# Patient Record
Sex: FEMALE | Race: WHITE | NOT HISPANIC OR LATINO | Employment: OTHER | URBAN - METROPOLITAN AREA
[De-identification: names, ages, dates, MRNs, and addresses within clinical notes are randomized per-mention and may not be internally consistent; named-entity substitution may affect disease eponyms.]

---

## 2019-04-05 NOTE — PATIENT DISCUSSION
New Prescription: Restasis (cyclosporine): dropperette: 0.05% 1 drop twice a day as directed into both eyes 04-

## 2019-06-18 NOTE — PATIENT DISCUSSION
Continue: Refresh Repair (carboxymethylcellulose-glycern): drops: 0.5-0.9% 1 drop twice a day as needed into both eyes

## 2019-06-18 NOTE — PATIENT DISCUSSION
Continue: Restasis (cyclosporine): dropperette: 0.05% 1 drop twice a day as directed into both eyes 04-

## 2021-06-15 NOTE — PATIENT DISCUSSION
"Patient complains of ""floaters"" in their vision.  Should an increase of floaters or flashes of light begin

## 2021-06-15 NOTE — PATIENT DISCUSSION
VA stable, if worsens can refer to Retina for evaluation. OCT MAC performed today and reviewed with patient.

## 2021-06-15 NOTE — PATIENT DISCUSSION
Continue Restasis BID OU, Retaine MGD BID OU, Refresh Relieva  as needed and  Cromolyn use only for itching OU.

## 2021-06-15 NOTE — PATIENT DISCUSSION
Stopped Today: Refresh Repair (carboxymethylcellulose-glycern): drops: 0.5-0.9% 1 drop twice a day as needed into both eyes

## 2022-03-03 ENCOUNTER — NEW PATIENT (OUTPATIENT)
Dept: URBAN - METROPOLITAN AREA CLINIC 11 | Facility: CLINIC | Age: 67
End: 2022-03-03

## 2022-03-03 DIAGNOSIS — H34.8320: ICD-10-CM

## 2022-03-03 DIAGNOSIS — H35.361: ICD-10-CM

## 2022-03-03 PROCEDURE — 67028 INJECTION EYE DRUG: CPT

## 2022-03-03 PROCEDURE — 92134 CPTRZ OPH DX IMG PST SGM RTA: CPT

## 2022-03-03 NOTE — PATIENT DISCUSSION
45 minutes was spent in conversation and examination with patient. Avastin injection done today without complication in her left eye.  I will plan to see her again in one month for exam and possible Avastin series in the left eye.  The right eye is stable and I recommend observation there.

## 2022-03-03 NOTE — PROCEDURE NOTE: CLINICAL
PROCEDURE NOTE: Avastin () #1 OS. Diagnosis: Branch Retinal Vein Occlusion with Macular Edema. Anesthesia: Topical/Subconjunctival. Prep: Betadine Drops and Betadine Scrub. Betadine prep was performed. Product is within expiration date, and has been verified. An unopened 30 g needle was securely affixed to the 1 cc syringe. Excess drug and air bubbles were carefully expressed such that the plunger aligned with the .05 mL rylee on the syringe. A lid speculum was used. After the Betadine prep, intravitreal injection of Avastin 1.25mg/0.05 ml was given. Injection site: 3-4 mm from the limbus. The needle was withdrawn; retinal perfusion was verified. Lid speculum removed. The eye was irrigated with sterile irrigating solution. The betadine was washed away. Patient tolerated procedure well. Count fingers vision was verified. There were no complications. Patient given office phone number/answering service phone number. Post-injection instructions were reviewed and understood. Symptoms of RD and endophthalmitis following intravitreal injection were discussed. Patient advised to call right away if any loss of vision, new floaters, or eye pain. Post-op instructions given. Patient was given the standard instruction sheet. Appropriate follow-up was arranged. Guille Neal

## 2022-03-07 ASSESSMENT — TONOMETRY
OD_IOP_MMHG: 21
OS_IOP_MMHG: 19

## 2022-03-07 ASSESSMENT — VISUAL ACUITY
OD_CC: 20/20-1
OS_CC: 20/100

## 2022-04-05 ENCOUNTER — ESTABLISHED PATIENT (OUTPATIENT)
Dept: URBAN - METROPOLITAN AREA CLINIC 11 | Facility: CLINIC | Age: 67
End: 2022-04-05

## 2022-04-05 DIAGNOSIS — H34.8320: ICD-10-CM

## 2022-04-05 PROCEDURE — 92134 CPTRZ OPH DX IMG PST SGM RTA: CPT

## 2022-04-05 PROCEDURE — 67028 INJECTION EYE DRUG: CPT

## 2022-04-05 PROCEDURE — 99213 OFFICE O/P EST LOW 20 MIN: CPT

## 2022-04-05 ASSESSMENT — TONOMETRY
OD_IOP_MMHG: 20
OS_IOP_MMHG: 20

## 2022-04-05 ASSESSMENT — VISUAL ACUITY
OD_CC: 20/20
OS_CC: 20/25

## 2022-04-05 NOTE — PROCEDURE NOTE: CLINICAL
PROCEDURE NOTE: Avastin () #2 OS. Diagnosis: Branch Retinal Vein Occlusion with Macular Edema. Anesthesia: Topical/Subconjunctival. Prep: Betadine Drops and Betadine Scrub. Betadine prep was performed. Product is within expiration date, and has been verified. An unopened 30 g needle was securely affixed to the 1 cc syringe. Excess drug and air bubbles were carefully expressed such that the plunger aligned with the .05 mL rylee on the syringe. A lid speculum was used. After the Betadine prep, intravitreal injection of Avastin 1.25mg/0.05 ml was given. Injection site: 3-4 mm from the limbus. The needle was withdrawn; retinal perfusion was verified. Lid speculum removed. The eye was irrigated with sterile irrigating solution. The betadine was washed away. Patient tolerated procedure well. Count fingers vision was verified. There were no complications. Patient given office phone number/answering service phone number. Post-injection instructions were reviewed and understood. Symptoms of RD and endophthalmitis following intravitreal injection were discussed. Patient advised to call right away if any loss of vision, new floaters, or eye pain. Post-op instructions given. Patient was given the standard instruction sheet. Appropriate follow-up was arranged. Eugene Shah

## 2022-04-05 NOTE — PATIENT DISCUSSION
35 minutes was spent in conversation and examination with patient. Avastin injection done today without complication in her left eye.  I will plan two more Avastin injections in the left eye on a monthly basis.

## 2022-05-12 ENCOUNTER — CLINIC PROCEDURE ONLY (OUTPATIENT)
Dept: URBAN - METROPOLITAN AREA CLINIC 11 | Facility: CLINIC | Age: 67
End: 2022-05-12

## 2022-05-12 DIAGNOSIS — H34.8320: ICD-10-CM

## 2022-05-12 PROCEDURE — 67028 INJECTION EYE DRUG: CPT

## 2022-05-12 ASSESSMENT — TONOMETRY
OD_IOP_MMHG: 18
OS_IOP_MMHG: 18

## 2022-05-12 ASSESSMENT — VISUAL ACUITY
OD_CC: 20/25+1
OS_CC: 20/30

## 2022-05-12 NOTE — PATIENT DISCUSSION
Avastin injection #3/4 LEFT EYE today. The injection was given and tolerated well by patient. Post-injection instructions were reviewed and understood by the patient.

## 2022-05-12 NOTE — PROCEDURE NOTE: CLINICAL
PROCEDURE NOTE: Avastin () #3 OS. Diagnosis: Branch Retinal Vein Occlusion with Macular Edema. Anesthesia: Topical/Subconjunctival. Prep: Betadine Drops and Betadine Scrub. Betadine prep was performed. Product is within expiration date, and has been verified. An unopened 30 g needle was securely affixed to the 1 cc syringe. Excess drug and air bubbles were carefully expressed such that the plunger aligned with the .05 mL rylee on the syringe. A lid speculum was used. After the Betadine prep, intravitreal injection of Avastin 1.25mg/0.05 ml was given. Injection site: 3-4 mm from the limbus. The needle was withdrawn; retinal perfusion was verified. Lid speculum removed. The eye was irrigated with sterile irrigating solution. The betadine was washed away. Patient tolerated procedure well. Count fingers vision was verified. There were no complications. Patient given office phone number/answering service phone number. Post-injection instructions were reviewed and understood. Symptoms of RD and endophthalmitis following intravitreal injection were discussed. Patient advised to call right away if any loss of vision, new floaters, or eye pain. Post-op instructions given. Patient was given the standard instruction sheet. Appropriate follow-up was arranged. Dea Sanchez

## 2022-06-07 NOTE — PATIENT DISCUSSION
Patient would like to consider a pause in medication. Encouraged continuation of artificial tears if she decides to stop use of Restasis.

## 2022-06-14 ENCOUNTER — CLINIC PROCEDURE ONLY (OUTPATIENT)
Dept: URBAN - METROPOLITAN AREA CLINIC 11 | Facility: CLINIC | Age: 67
End: 2022-06-14

## 2022-06-14 DIAGNOSIS — H34.8320: ICD-10-CM

## 2022-06-14 PROCEDURE — 67028 INJECTION EYE DRUG: CPT

## 2022-06-14 ASSESSMENT — VISUAL ACUITY
OS_SC: 20/30+2
OD_SC: 20/20-1

## 2022-06-14 ASSESSMENT — TONOMETRY
OS_IOP_MMHG: 18
OD_IOP_MMHG: 20

## 2022-06-14 NOTE — PATIENT DISCUSSION
Avastin injection #4/4 LEFT EYE today. The injection was given and tolerated well by patient. Post-injection instructions were reviewed and understood by the patient.

## 2022-06-14 NOTE — PROCEDURE NOTE: CLINICAL
PROCEDURE NOTE: Avastin () #4 OS. Diagnosis: Branch Retinal Vein Occlusion with Macular Edema. Anesthesia: Topical/Subconjunctival. Prep: Betadine Drops and Betadine Scrub. Betadine prep was performed. Product is within expiration date, and has been verified. An unopened 30 g needle was securely affixed to the 1 cc syringe. Excess drug and air bubbles were carefully expressed such that the plunger aligned with the .05 mL rylee on the syringe. A lid speculum was used. After the Betadine prep, intravitreal injection of Avastin 1.25mg/0.05 ml was given. Injection site: 3-4 mm from the limbus. The needle was withdrawn; retinal perfusion was verified. Lid speculum removed. The eye was irrigated with sterile irrigating solution. The betadine was washed away. Patient tolerated procedure well. Count fingers vision was verified. There were no complications. Patient given office phone number/answering service phone number. Post-injection instructions were reviewed and understood. Symptoms of RD and endophthalmitis following intravitreal injection were discussed. Patient advised to call right away if any loss of vision, new floaters, or eye pain. Post-op instructions given. Patient was given the standard instruction sheet. Appropriate follow-up was arranged. Roni Shea

## 2022-06-14 NOTE — PATIENT DISCUSSION
Avastin injection in the left eye was given and tolerated well by Mrs. Florinda Fry today. Post-injection care was communicated and understood in which she understood and I will see her again in a month for a follow up with a possible Avastin injection OS.

## 2022-06-18 RX ORDER — OLMESARTAN MEDOXOMIL 40 MG/1
TABLET ORAL
COMMUNITY

## 2022-06-18 RX ORDER — AMLODIPINE BESYLATE 5 MG/1
TABLET ORAL
COMMUNITY
End: 2022-11-01

## 2022-06-18 RX ORDER — INSULIN LISPRO 100 [IU]/ML
10 INJECTION, SOLUTION INTRAVENOUS; SUBCUTANEOUS
COMMUNITY

## 2022-06-18 RX ORDER — METFORMIN HYDROCHLORIDE 500 MG/1
TABLET, EXTENDED RELEASE ORAL
COMMUNITY
End: 2022-09-07 | Stop reason: SDUPTHER

## 2022-06-18 RX ORDER — ATORVASTATIN CALCIUM 10 MG/1
TABLET, FILM COATED ORAL
COMMUNITY

## 2022-06-18 RX ORDER — INSULIN GLARGINE 100 [IU]/ML
40 INJECTION, SOLUTION SUBCUTANEOUS DAILY
COMMUNITY

## 2022-06-18 RX ORDER — DULAGLUTIDE 1.5 MG/.5ML
INJECTION, SOLUTION SUBCUTANEOUS
COMMUNITY
End: 2022-08-15

## 2022-07-11 PROBLEM — R20.2 PARESTHESIAS IN RIGHT HAND: Status: ACTIVE | Noted: 2022-07-11

## 2022-07-11 PROBLEM — R06.02 SHORTNESS OF BREATH: Status: ACTIVE | Noted: 2022-07-11

## 2022-07-11 PROBLEM — J30.9 ALLERGIC RHINITIS: Status: ACTIVE | Noted: 2022-07-11

## 2022-07-11 PROBLEM — N95.9 MENOPAUSAL AND POSTMENOPAUSAL DISORDER: Status: ACTIVE | Noted: 2022-07-11

## 2022-07-11 PROBLEM — M17.12 ARTHRITIS OF LEFT KNEE: Status: ACTIVE | Noted: 2022-07-11

## 2022-07-11 PROBLEM — D50.9 IRON DEFICIENCY ANEMIA: Status: ACTIVE | Noted: 2022-07-11

## 2022-07-11 PROBLEM — F32.1 MODERATE SINGLE CURRENT EPISODE OF MAJOR DEPRESSIVE DISORDER (HCC): Status: ACTIVE | Noted: 2022-07-11

## 2022-07-11 PROBLEM — M65.4 RADIAL STYLOID TENOSYNOVITIS: Status: ACTIVE | Noted: 2022-07-11

## 2022-07-11 PROBLEM — M51.9 LUMBAR DISC DISEASE: Status: ACTIVE | Noted: 2022-07-11

## 2022-07-11 PROBLEM — M75.101 TEAR OF RIGHT ROTATOR CUFF: Status: ACTIVE | Noted: 2022-07-11

## 2022-07-11 PROBLEM — R91.8 MULTIPLE PULMONARY NODULES: Status: ACTIVE | Noted: 2022-07-11

## 2022-07-11 PROBLEM — G56.01 CARPAL TUNNEL SYNDROME, RIGHT: Status: ACTIVE | Noted: 2022-07-11

## 2022-07-11 PROBLEM — G57.10 MERALGIA PARESTHETICA: Status: ACTIVE | Noted: 2022-07-11

## 2022-07-11 PROBLEM — M19.011 ARTHRITIS OF RIGHT SHOULDER REGION: Status: ACTIVE | Noted: 2022-07-11

## 2022-07-11 PROBLEM — E78.2 MIXED HYPERLIPIDEMIA: Status: ACTIVE | Noted: 2022-07-11

## 2022-07-11 PROBLEM — I10 ESSENTIAL HYPERTENSION: Status: ACTIVE | Noted: 2022-07-11

## 2022-07-11 PROBLEM — E27.8 ADRENAL NODULE (HCC): Status: ACTIVE | Noted: 2022-07-11

## 2022-07-11 PROBLEM — J44.9 CHRONIC OBSTRUCTIVE PULMONARY DISEASE (HCC): Status: ACTIVE | Noted: 2022-07-11

## 2022-07-11 PROBLEM — R22.1 NECK MASS: Status: ACTIVE | Noted: 2022-07-11

## 2022-07-11 PROBLEM — M43.10 SPONDYLOLISTHESIS: Status: ACTIVE | Noted: 2022-07-11

## 2022-07-11 PROBLEM — G47.33 OBSTRUCTIVE SLEEP APNEA: Status: ACTIVE | Noted: 2022-07-11

## 2022-07-11 PROBLEM — M19.049 DEGENERATIVE JOINT DISEASE OF HAND: Status: ACTIVE | Noted: 2022-07-11

## 2022-07-11 PROBLEM — S42.202A FRACTURE OF PROXIMAL END OF LEFT HUMERUS: Status: ACTIVE | Noted: 2022-07-11

## 2022-07-11 PROBLEM — E66.9 OBESITY: Status: ACTIVE | Noted: 2022-07-11

## 2022-07-11 PROBLEM — G47.69 NOCTURNAL MYOCLONUS: Status: ACTIVE | Noted: 2022-07-11

## 2022-07-11 PROBLEM — R51.9 FRONTAL HEADACHE: Status: ACTIVE | Noted: 2022-07-11

## 2022-07-11 PROBLEM — S27.0XXA TRAUMATIC PNEUMOTHORAX: Status: ACTIVE | Noted: 2022-07-11

## 2022-07-11 PROBLEM — K76.0 STEATOSIS OF LIVER: Status: ACTIVE | Noted: 2022-07-11

## 2022-07-11 PROBLEM — J98.4 RESTRICTIVE LUNG DISEASE: Status: ACTIVE | Noted: 2022-07-11

## 2022-07-11 PROBLEM — G25.81 RESTLESS LEGS SYNDROME: Status: ACTIVE | Noted: 2022-07-11

## 2022-07-11 PROBLEM — I73.9 CLAUDICATION OF BOTH LOWER EXTREMITIES (HCC): Status: ACTIVE | Noted: 2022-07-11

## 2022-07-14 ENCOUNTER — FOLLOW UP (OUTPATIENT)
Dept: URBAN - METROPOLITAN AREA CLINIC 11 | Facility: CLINIC | Age: 67
End: 2022-07-14

## 2022-07-14 DIAGNOSIS — H34.8320: ICD-10-CM

## 2022-07-14 DIAGNOSIS — H35.361: ICD-10-CM

## 2022-07-14 PROCEDURE — 99214 OFFICE O/P EST MOD 30 MIN: CPT

## 2022-07-14 PROCEDURE — 92134 CPTRZ OPH DX IMG PST SGM RTA: CPT

## 2022-07-14 ASSESSMENT — VISUAL ACUITY
OS_CC: 20/30-1
OD_CC: 20/20

## 2022-07-14 ASSESSMENT — TONOMETRY
OS_IOP_MMHG: 21
OD_IOP_MMHG: 19

## 2022-08-15 PROBLEM — Z79.4 TYPE 2 DIABETES MELLITUS WITHOUT COMPLICATION, WITH LONG-TERM CURRENT USE OF INSULIN (HCC): Status: ACTIVE | Noted: 2022-08-15

## 2022-08-15 PROBLEM — R51.9 FRONTAL HEADACHE: Status: RESOLVED | Noted: 2022-07-11 | Resolved: 2022-08-15

## 2022-08-15 PROBLEM — E11.9 TYPE 2 DIABETES MELLITUS WITHOUT COMPLICATION, WITH LONG-TERM CURRENT USE OF INSULIN (HCC): Status: ACTIVE | Noted: 2022-08-15

## 2022-08-15 PROBLEM — M81.0 AGE-RELATED OSTEOPOROSIS WITHOUT CURRENT PATHOLOGICAL FRACTURE: Status: ACTIVE | Noted: 2022-08-15

## 2022-08-15 PROBLEM — I25.10 CORONARY ARTERY CALCIFICATION SEEN ON CAT SCAN: Status: ACTIVE | Noted: 2022-08-15

## 2022-08-15 PROBLEM — Z87.898 H/O MULTIPLE PULMONARY NODULES: Status: ACTIVE | Noted: 2022-08-15

## 2022-08-18 ENCOUNTER — FOLLOW UP (OUTPATIENT)
Dept: URBAN - METROPOLITAN AREA CLINIC 11 | Facility: CLINIC | Age: 67
End: 2022-08-18

## 2022-08-18 DIAGNOSIS — H34.8320: ICD-10-CM

## 2022-08-18 PROCEDURE — 67028 INJECTION EYE DRUG: CPT

## 2022-08-18 PROCEDURE — 99213 OFFICE O/P EST LOW 20 MIN: CPT

## 2022-08-18 PROCEDURE — 92134 CPTRZ OPH DX IMG PST SGM RTA: CPT

## 2022-08-18 ASSESSMENT — VISUAL ACUITY
OS_CC: 20/50
OD_CC: 20/25+1

## 2022-08-18 ASSESSMENT — TONOMETRY
OS_IOP_MMHG: 14
OD_IOP_MMHG: 19

## 2022-08-18 NOTE — PROCEDURE NOTE: CLINICAL
PROCEDURE NOTE: Avastin () #5 OS. Diagnosis: Branch Retinal Vein Occlusion with Macular Edema. Anesthesia: Topical/Subconjunctival. Prep: Betadine Drops and Betadine Scrub. Betadine prep was performed. Product is within expiration date, and has been verified. An unopened 30 g needle was securely affixed to the 1 cc syringe. Excess drug and air bubbles were carefully expressed such that the plunger aligned with the .05 mL rlyee on the syringe. A lid speculum was used. After the Betadine prep, intravitreal injection of Avastin 1.25mg/0.05 ml was given. Injection site: 3-4 mm from the limbus. The needle was withdrawn; retinal perfusion was verified. Lid speculum removed. The eye was irrigated with sterile irrigating solution. The betadine was washed away. Patient tolerated procedure well. Count fingers vision was verified. There were no complications. Patient given office phone number/answering service phone number. Post-injection instructions were reviewed and understood. Symptoms of RD and endophthalmitis following intravitreal injection were discussed. Patient advised to call right away if any loss of vision, new floaters, or eye pain. Post-op instructions given. Patient was given the standard instruction sheet. Appropriate follow-up was arranged. Len Somers

## 2022-08-18 NOTE — PATIENT DISCUSSION
Recommended Avastin injection #5/7 today. The injection was given and tolerated well by patient. Post-injection instructions were reviewed and understood by the patient.

## 2022-08-18 NOTE — PATIENT DISCUSSION
Due to the presence of edema, I will do one Avastin injection today and two more on a monthly basis.

## 2022-09-22 ENCOUNTER — CLINIC PROCEDURE ONLY (OUTPATIENT)
Dept: URBAN - METROPOLITAN AREA CLINIC 11 | Facility: CLINIC | Age: 67
End: 2022-09-22

## 2022-09-22 DIAGNOSIS — H34.8320: ICD-10-CM

## 2022-09-22 PROCEDURE — 67028 INJECTION EYE DRUG: CPT

## 2022-09-22 ASSESSMENT — VISUAL ACUITY
OS_CC: 20/40+1
OD_CC: 20/25+1

## 2022-09-22 ASSESSMENT — TONOMETRY: OS_IOP_MMHG: 22

## 2022-09-22 NOTE — PATIENT DISCUSSION
Avastin injection #6/7 today. The injection was given and tolerated well by patient. Post-injection instructions were reviewed and understood by the patient.

## 2022-09-22 NOTE — PROCEDURE NOTE: CLINICAL
PROCEDURE NOTE: Avastin () #6 OS. Diagnosis: Branch Retinal Vein Occlusion with Macular Edema. Anesthesia: Topical/Subconjunctival. Prep: Betadine Drops and Betadine Scrub. Betadine prep was performed. Product is within expiration date, and has been verified. An unopened 30 g needle was securely affixed to the 1 cc syringe. Excess drug and air bubbles were carefully expressed such that the plunger aligned with the .05 mL rylee on the syringe. A lid speculum was used. After the Betadine prep, intravitreal injection of Avastin 1.25mg/0.05 ml was given. Injection site: 3-4 mm from the limbus. The needle was withdrawn; retinal perfusion was verified. Lid speculum removed. The eye was irrigated with sterile irrigating solution. The betadine was washed away. Patient tolerated procedure well. Count fingers vision was verified. There were no complications. Patient given office phone number/answering service phone number. Post-injection instructions were reviewed and understood. Symptoms of RD and endophthalmitis following intravitreal injection were discussed. Patient advised to call right away if any loss of vision, new floaters, or eye pain. Post-op instructions given. Patient was given the standard instruction sheet. Appropriate follow-up was arranged. Mikey Richardson

## 2022-10-25 ENCOUNTER — CLINIC PROCEDURE ONLY (OUTPATIENT)
Dept: URBAN - METROPOLITAN AREA CLINIC 11 | Facility: CLINIC | Age: 67
End: 2022-10-25

## 2022-10-25 DIAGNOSIS — H34.8320: ICD-10-CM

## 2022-10-25 PROCEDURE — 67028 INJECTION EYE DRUG: CPT

## 2022-10-25 ASSESSMENT — TONOMETRY
OS_IOP_MMHG: 16
OD_IOP_MMHG: 17

## 2022-10-25 ASSESSMENT — VISUAL ACUITY
OS_CC: 20/40-1
OD_CC: 20/25+1

## 2022-10-25 NOTE — PROCEDURE NOTE: CLINICAL
PROCEDURE NOTE: Avastin () #7 OS. Diagnosis: Branch Retinal Vein Occlusion with Macular Edema. Anesthesia: Topical/Subconjunctival. Prep: Betadine Drops and Betadine Scrub. Betadine prep was performed. Product is within expiration date, and has been verified. An unopened 30 g needle was securely affixed to the 1 cc syringe. Excess drug and air bubbles were carefully expressed such that the plunger aligned with the .05 mL rylee on the syringe. A lid speculum was used. After the Betadine prep, intravitreal injection of Avastin 1.25mg/0.05 ml was given. Injection site: 3-4 mm from the limbus. The needle was withdrawn; retinal perfusion was verified. Lid speculum removed. The eye was irrigated with sterile irrigating solution. The betadine was washed away. Patient tolerated procedure well. Count fingers vision was verified. There were no complications. Patient given office phone number/answering service phone number. Post-injection instructions were reviewed and understood. Symptoms of RD and endophthalmitis following intravitreal injection were discussed. Patient advised to call right away if any loss of vision, new floaters, or eye pain. Post-op instructions given. Patient was given the standard instruction sheet. Appropriate follow-up was arranged. Alissa Pantoja

## 2022-10-25 NOTE — PATIENT DISCUSSION
Avastin injection #7/7 today. The injection was given and tolerated well by patient. Post-injection instructions were reviewed and understood by the patient.

## 2022-12-06 ENCOUNTER — FOLLOW UP (OUTPATIENT)
Dept: URBAN - METROPOLITAN AREA CLINIC 11 | Facility: CLINIC | Age: 67
End: 2022-12-06

## 2022-12-06 PROCEDURE — 92134 CPTRZ OPH DX IMG PST SGM RTA: CPT

## 2022-12-06 PROCEDURE — 99214 OFFICE O/P EST MOD 30 MIN: CPT

## 2022-12-06 PROCEDURE — 67028 INJECTION EYE DRUG: CPT

## 2022-12-06 ASSESSMENT — TONOMETRY
OD_IOP_MMHG: 17
OS_IOP_MMHG: 15

## 2022-12-06 ASSESSMENT — VISUAL ACUITY
OD_CC: 20/30+2
OS_CC: 20/30

## 2022-12-06 NOTE — PROCEDURE NOTE: CLINICAL
PROCEDURE NOTE: Avastin () #8 OS. Diagnosis: Branch Retinal Vein Occlusion with Macular Edema. Anesthesia: Topical/Subconjunctival. Prep: Betadine Drops and Betadine Scrub. Betadine prep was performed. Product is within expiration date, and has been verified. An unopened 27 g needle was securely affixed to the 1 cc syringe. Excess drug and air bubbles were carefully expressed such that the plunger aligned with the .05 mL rylee on the syringe. After the Betadine prep, intravitreal injection of Avastin 1.25mg/0.05 ml was given. Injection site: 3-4 mm from the limbus. The needle was withdrawn; retinal perfusion was verified. The eye was irrigated with sterile irrigating solution. The betadine was washed away. Patient tolerated procedure well. There were no complications. Patient given office phone number/answering service phone number. Post-injection instructions were reviewed and understood. Symptoms of RD and endophthalmitis following intravitreal injection were discussed. Patient advised to call right away if any loss of vision, new floaters, or eye pain. Post-op instructions given. Patient was given the standard instruction sheet. Appropriate follow-up was arranged. Chen Wall

## 2022-12-06 NOTE — PATIENT DISCUSSION
Advised patient to continue annual exams with Dr. Brandon Farmer for general eye care with updated refraction.

## 2023-01-26 ENCOUNTER — CLINIC PROCEDURE ONLY (OUTPATIENT)
Dept: URBAN - METROPOLITAN AREA CLINIC 11 | Facility: CLINIC | Age: 68
End: 2023-01-26

## 2023-01-26 DIAGNOSIS — H34.8320: ICD-10-CM

## 2023-01-26 PROCEDURE — 67028 INJECTION EYE DRUG: CPT

## 2023-01-26 ASSESSMENT — VISUAL ACUITY
OD_CC: 20/25
OS_CC: 20/30+2

## 2023-01-26 ASSESSMENT — TONOMETRY
OS_IOP_MMHG: 17
OD_IOP_MMHG: 20

## 2023-01-26 NOTE — PATIENT DISCUSSION
Advised patient to continue annual exams with Dr. Silverio Pacheco for general eye care with updated refraction.

## 2023-01-26 NOTE — PROCEDURE NOTE: CLINICAL
PROCEDURE NOTE: Avastin () #9 OS. Diagnosis: Branch Retinal Vein Occlusion with Macular Edema. Anesthesia: Topical/Subconjunctival. Prep: Betadine Drops and Betadine Scrub. Betadine prep was performed. Product is within expiration date, and has been verified. An unopened 30 g needle was securely affixed to the 1 cc syringe. Excess drug and air bubbles were carefully expressed such that the plunger aligned with the .05 mL rylee on the syringe. After the Betadine prep, intravitreal injection of Avastin 1.25mg/0.05 ml was given. Injection site: 3-4 mm from the limbus. The needle was withdrawn; retinal perfusion was verified. The eye was irrigated with sterile irrigating solution. The betadine was washed away. Patient tolerated procedure well. There were no complications. Patient given office phone number/answering service phone number. Post-injection instructions were reviewed and understood. Symptoms of RD and endophthalmitis following intravitreal injection were discussed. Patient advised to call right away if any loss of vision, new floaters, or eye pain. Post-op instructions given. Patient was given the standard instruction sheet. Appropriate follow-up was arranged. Eh Sood

## 2023-01-26 NOTE — PATIENT DISCUSSION
Avastin injection #9/10 today. The injection was given and tolerated well by patient. Post-injection instructions were reviewed and understood by the patient.

## 2023-04-25 ENCOUNTER — FOLLOW UP (OUTPATIENT)
Dept: URBAN - METROPOLITAN AREA CLINIC 11 | Facility: CLINIC | Age: 68
End: 2023-04-25

## 2023-04-25 DIAGNOSIS — H43.821: ICD-10-CM

## 2023-04-25 DIAGNOSIS — H43.812: ICD-10-CM

## 2023-04-25 DIAGNOSIS — H35.361: ICD-10-CM

## 2023-04-25 DIAGNOSIS — H34.8320: ICD-10-CM

## 2023-04-25 DIAGNOSIS — H25.13: ICD-10-CM

## 2023-04-25 PROCEDURE — 92134 CPTRZ OPH DX IMG PST SGM RTA: CPT

## 2023-04-25 PROCEDURE — 99214 OFFICE O/P EST MOD 30 MIN: CPT

## 2023-04-25 ASSESSMENT — TONOMETRY
OD_IOP_MMHG: 17
OS_IOP_MMHG: 21

## 2023-04-25 ASSESSMENT — VISUAL ACUITY
OS_CC: 20/40
OD_CC: 20/40

## 2023-05-30 ENCOUNTER — FOLLOW UP (OUTPATIENT)
Dept: URBAN - METROPOLITAN AREA CLINIC 11 | Facility: CLINIC | Age: 68
End: 2023-05-30

## 2023-05-30 DIAGNOSIS — H43.812: ICD-10-CM

## 2023-05-30 DIAGNOSIS — H35.361: ICD-10-CM

## 2023-05-30 DIAGNOSIS — H34.8320: ICD-10-CM

## 2023-05-30 DIAGNOSIS — E11.9: ICD-10-CM

## 2023-05-30 DIAGNOSIS — Z79.4: ICD-10-CM

## 2023-05-30 DIAGNOSIS — H25.13: ICD-10-CM

## 2023-05-30 PROCEDURE — 92134 CPTRZ OPH DX IMG PST SGM RTA: CPT

## 2023-05-30 PROCEDURE — 67028 INJECTION EYE DRUG: CPT

## 2023-05-30 PROCEDURE — 99214 OFFICE O/P EST MOD 30 MIN: CPT

## 2023-05-30 ASSESSMENT — VISUAL ACUITY
OS_CC: 20/40
OD_CC: 20/40+1

## 2023-05-30 ASSESSMENT — TONOMETRY
OS_IOP_MMHG: 14
OD_IOP_MMHG: 15

## 2023-07-03 ENCOUNTER — CLINIC PROCEDURE ONLY (OUTPATIENT)
Dept: URBAN - METROPOLITAN AREA CLINIC 11 | Facility: CLINIC | Age: 68
End: 2023-07-03

## 2023-07-03 DIAGNOSIS — H34.8320: ICD-10-CM

## 2023-07-03 PROCEDURE — 67028 INJECTION EYE DRUG: CPT

## 2023-07-03 ASSESSMENT — TONOMETRY
OD_IOP_MMHG: 14
OS_IOP_MMHG: 13

## 2023-07-03 ASSESSMENT — VISUAL ACUITY
OS_CC: 20/30+2
OD_CC: 20/30+2

## 2023-08-07 ENCOUNTER — CLINIC PROCEDURE ONLY (OUTPATIENT)
Dept: URBAN - METROPOLITAN AREA CLINIC 11 | Facility: CLINIC | Age: 68
End: 2023-08-07

## 2023-08-07 DIAGNOSIS — H34.8320: ICD-10-CM

## 2023-08-07 PROCEDURE — 67028 INJECTION EYE DRUG: CPT

## 2023-08-07 ASSESSMENT — VISUAL ACUITY
OD_CC: 20/25-2
OS_CC: 20/25-3

## 2023-08-07 ASSESSMENT — TONOMETRY
OS_IOP_MMHG: 18
OD_IOP_MMHG: 14

## 2023-09-19 ENCOUNTER — FOLLOW UP (OUTPATIENT)
Dept: URBAN - METROPOLITAN AREA CLINIC 11 | Facility: CLINIC | Age: 68
End: 2023-09-19

## 2023-09-19 DIAGNOSIS — H43.812: ICD-10-CM

## 2023-09-19 DIAGNOSIS — H25.13: ICD-10-CM

## 2023-09-19 DIAGNOSIS — Z79.4: ICD-10-CM

## 2023-09-19 DIAGNOSIS — H35.361: ICD-10-CM

## 2023-09-19 DIAGNOSIS — H34.8320: ICD-10-CM

## 2023-09-19 DIAGNOSIS — E11.9: ICD-10-CM

## 2023-09-19 PROCEDURE — 99213 OFFICE O/P EST LOW 20 MIN: CPT

## 2023-09-19 PROCEDURE — 92134 CPTRZ OPH DX IMG PST SGM RTA: CPT

## 2023-09-19 ASSESSMENT — VISUAL ACUITY
OD_CC: 20/30+1
OS_CC: 20/25

## 2023-09-19 ASSESSMENT — TONOMETRY
OD_IOP_MMHG: 18
OS_IOP_MMHG: 16

## 2023-10-19 ENCOUNTER — FOLLOW UP (OUTPATIENT)
Dept: URBAN - METROPOLITAN AREA CLINIC 11 | Facility: CLINIC | Age: 68
End: 2023-10-19

## 2023-10-19 DIAGNOSIS — H43.812: ICD-10-CM

## 2023-10-19 DIAGNOSIS — Z79.4: ICD-10-CM

## 2023-10-19 DIAGNOSIS — H34.8320: ICD-10-CM

## 2023-10-19 DIAGNOSIS — E11.9: ICD-10-CM

## 2023-10-19 DIAGNOSIS — H25.13: ICD-10-CM

## 2023-10-19 PROCEDURE — 92134 CPTRZ OPH DX IMG PST SGM RTA: CPT

## 2023-10-19 PROCEDURE — 67028 INJECTION EYE DRUG: CPT

## 2023-10-19 PROCEDURE — 99213 OFFICE O/P EST LOW 20 MIN: CPT | Mod: 25,LT

## 2023-10-19 ASSESSMENT — VISUAL ACUITY
OS_CC: 20/40-1
OD_CC: 20/25-2

## 2023-10-19 ASSESSMENT — TONOMETRY
OD_IOP_MMHG: 14
OS_IOP_MMHG: 15

## 2023-11-30 ENCOUNTER — CLINIC PROCEDURE ONLY (OUTPATIENT)
Dept: URBAN - METROPOLITAN AREA CLINIC 11 | Facility: CLINIC | Age: 68
End: 2023-11-30

## 2023-11-30 DIAGNOSIS — H34.8320: ICD-10-CM

## 2023-11-30 PROCEDURE — 67028 INJECTION EYE DRUG: CPT

## 2023-11-30 ASSESSMENT — VISUAL ACUITY
OD_CC: 20/25
OS_CC: 20/30

## 2023-11-30 ASSESSMENT — TONOMETRY
OD_IOP_MMHG: 19
OS_IOP_MMHG: 17

## 2024-01-04 ENCOUNTER — FOLLOW UP (OUTPATIENT)
Dept: URBAN - METROPOLITAN AREA CLINIC 11 | Facility: CLINIC | Age: 69
End: 2024-01-04

## 2024-01-04 DIAGNOSIS — H34.8320: ICD-10-CM

## 2024-01-04 DIAGNOSIS — E11.9: ICD-10-CM

## 2024-01-04 DIAGNOSIS — Z79.4: ICD-10-CM

## 2024-01-04 DIAGNOSIS — H43.813: ICD-10-CM

## 2024-01-04 DIAGNOSIS — H35.361: ICD-10-CM

## 2024-01-04 PROCEDURE — 92134 CPTRZ OPH DX IMG PST SGM RTA: CPT

## 2024-01-04 PROCEDURE — 99213 OFFICE O/P EST LOW 20 MIN: CPT

## 2024-01-04 ASSESSMENT — VISUAL ACUITY
OS_CC: 20/25-2
OU_CC: 20/25-1
OD_CC: 20/25-2

## 2024-01-04 ASSESSMENT — TONOMETRY
OS_IOP_MMHG: 17
OD_IOP_MMHG: 20

## 2024-01-09 NOTE — PATIENT DISCUSSION
Posterior Vitreous Detachment Counseling: I have discussed the diagnosis of PVD with the patient and the possibility of a retinal tear or detachment. The signs/symptoms of a retinal tear/detachment were reviewed to include but not limited to redness, discharge, pain, increase or change in flashes of light, increase or change in floaters, decreased vision, part of the vision missing, veils or any other ocular concerns. I have further explained not all patients who develop a tear or detachment have notable symptoms, therefore, compliance with return visits are necessary. The patient was instructed to contact us immediately if they noticed any of the signs or symptoms of retinal detachment as noted above as the prognosis for any potential treatment options may be time related. Return for follow-up as scheduled. Signs of bleeding (nose bleeds, bleeding gums, blackened stool, unusual bruising)

## 2024-02-13 ENCOUNTER — FOLLOW UP (OUTPATIENT)
Dept: URBAN - METROPOLITAN AREA CLINIC 11 | Facility: CLINIC | Age: 69
End: 2024-02-13

## 2024-02-13 DIAGNOSIS — E11.9: ICD-10-CM

## 2024-02-13 DIAGNOSIS — Z79.4: ICD-10-CM

## 2024-02-13 DIAGNOSIS — H35.361: ICD-10-CM

## 2024-02-13 DIAGNOSIS — H43.813: ICD-10-CM

## 2024-02-13 DIAGNOSIS — H34.8320: ICD-10-CM

## 2024-02-13 PROCEDURE — 99213 OFFICE O/P EST LOW 20 MIN: CPT

## 2024-02-13 PROCEDURE — 92134 CPTRZ OPH DX IMG PST SGM RTA: CPT

## 2024-02-13 PROCEDURE — 67028 INJECTION EYE DRUG: CPT

## 2024-02-13 ASSESSMENT — VISUAL ACUITY
OD_CC: 20/25-1
OS_CC: 20/25-1

## 2024-02-13 ASSESSMENT — TONOMETRY
OS_IOP_MMHG: 15
OD_IOP_MMHG: 18

## 2024-03-26 ENCOUNTER — FOLLOW UP (OUTPATIENT)
Dept: URBAN - METROPOLITAN AREA CLINIC 11 | Facility: CLINIC | Age: 69
End: 2024-03-26

## 2024-03-26 DIAGNOSIS — H25.13: ICD-10-CM

## 2024-03-26 DIAGNOSIS — H43.813: ICD-10-CM

## 2024-03-26 DIAGNOSIS — E11.9: ICD-10-CM

## 2024-03-26 DIAGNOSIS — Z79.4: ICD-10-CM

## 2024-03-26 DIAGNOSIS — H34.8320: ICD-10-CM

## 2024-03-26 PROCEDURE — 99213 OFFICE O/P EST LOW 20 MIN: CPT

## 2024-03-26 PROCEDURE — 92134 CPTRZ OPH DX IMG PST SGM RTA: CPT

## 2024-03-26 ASSESSMENT — VISUAL ACUITY
OS_CC: 20/30-1
OD_CC: 20/25-1
OU_CC: 20/25-1

## 2024-03-26 ASSESSMENT — TONOMETRY
OS_IOP_MMHG: 18
OD_IOP_MMHG: 17

## 2024-05-14 ENCOUNTER — FOLLOW UP (OUTPATIENT)
Dept: URBAN - METROPOLITAN AREA CLINIC 11 | Facility: CLINIC | Age: 69
End: 2024-05-14

## 2024-05-14 DIAGNOSIS — H35.361: ICD-10-CM

## 2024-05-14 DIAGNOSIS — H34.8320: ICD-10-CM

## 2024-05-14 DIAGNOSIS — E11.9: ICD-10-CM

## 2024-05-14 DIAGNOSIS — H43.813: ICD-10-CM

## 2024-05-14 DIAGNOSIS — Z79.4: ICD-10-CM

## 2024-05-14 PROCEDURE — 99214 OFFICE O/P EST MOD 30 MIN: CPT | Mod: 25

## 2024-05-14 PROCEDURE — 92134 CPTRZ OPH DX IMG PST SGM RTA: CPT

## 2024-05-14 PROCEDURE — 67028 INJECTION EYE DRUG: CPT

## 2024-05-14 ASSESSMENT — VISUAL ACUITY
OD_CC: 20/30
OS_CC: 20/30+2

## 2024-05-14 ASSESSMENT — TONOMETRY
OS_IOP_MMHG: 12
OD_IOP_MMHG: 12

## 2024-06-25 ENCOUNTER — CLINIC PROCEDURE ONLY (OUTPATIENT)
Dept: URBAN - METROPOLITAN AREA CLINIC 11 | Facility: CLINIC | Age: 69
End: 2024-06-25

## 2024-06-25 DIAGNOSIS — H34.8320: ICD-10-CM

## 2024-06-25 PROCEDURE — 67210 TREATMENT OF RETINAL LESION: CPT

## 2024-06-25 ASSESSMENT — VISUAL ACUITY
OS_CC: 20/30-1
OD_CC: 20/30-1

## 2024-06-25 ASSESSMENT — TONOMETRY
OD_IOP_MMHG: 19
OS_IOP_MMHG: 20

## 2024-08-13 ENCOUNTER — FOLLOW UP (OUTPATIENT)
Dept: URBAN - METROPOLITAN AREA CLINIC 11 | Facility: CLINIC | Age: 69
End: 2024-08-13

## 2024-08-13 DIAGNOSIS — Z79.4: ICD-10-CM

## 2024-08-13 DIAGNOSIS — H35.361: ICD-10-CM

## 2024-08-13 DIAGNOSIS — H34.8320: ICD-10-CM

## 2024-08-13 DIAGNOSIS — E11.9: ICD-10-CM

## 2024-08-13 DIAGNOSIS — H43.813: ICD-10-CM

## 2024-08-13 PROCEDURE — 99213 OFFICE O/P EST LOW 20 MIN: CPT

## 2024-08-13 PROCEDURE — 92134 CPTRZ OPH DX IMG PST SGM RTA: CPT

## 2024-08-13 ASSESSMENT — VISUAL ACUITY
OS_CC: 20/30-1
OD_CC: 20/30-2

## 2024-08-13 ASSESSMENT — TONOMETRY
OS_IOP_MMHG: 17
OD_IOP_MMHG: 17

## 2024-10-01 ENCOUNTER — FOLLOW UP (OUTPATIENT)
Dept: URBAN - METROPOLITAN AREA CLINIC 11 | Facility: CLINIC | Age: 69
End: 2024-10-01

## 2024-10-01 DIAGNOSIS — E11.9: ICD-10-CM

## 2024-10-01 DIAGNOSIS — Z79.4: ICD-10-CM

## 2024-10-01 DIAGNOSIS — H35.361: ICD-10-CM

## 2024-10-01 DIAGNOSIS — H43.813: ICD-10-CM

## 2024-10-01 DIAGNOSIS — H34.8320: ICD-10-CM

## 2024-10-01 PROCEDURE — 67028 INJECTION EYE DRUG: CPT

## 2024-10-01 PROCEDURE — 99213 OFFICE O/P EST LOW 20 MIN: CPT | Mod: 25

## 2024-10-01 PROCEDURE — 92134 CPTRZ OPH DX IMG PST SGM RTA: CPT

## 2024-11-05 ENCOUNTER — CLINIC PROCEDURE ONLY (OUTPATIENT)
Dept: URBAN - METROPOLITAN AREA CLINIC 11 | Facility: CLINIC | Age: 69
End: 2024-11-05

## 2024-11-05 DIAGNOSIS — H34.8320: ICD-10-CM

## 2024-11-05 PROCEDURE — 67210 TREATMENT OF RETINAL LESION: CPT

## 2025-01-07 ENCOUNTER — COMPREHENSIVE EXAM (OUTPATIENT)
Age: 70
End: 2025-01-07

## 2025-01-07 DIAGNOSIS — H25.13: ICD-10-CM

## 2025-01-07 DIAGNOSIS — Z79.4: ICD-10-CM

## 2025-01-07 DIAGNOSIS — E11.9: ICD-10-CM

## 2025-01-07 DIAGNOSIS — H43.813: ICD-10-CM

## 2025-01-07 DIAGNOSIS — H34.8320: ICD-10-CM

## 2025-01-07 DIAGNOSIS — H35.361: ICD-10-CM

## 2025-01-07 PROCEDURE — 99214 OFFICE O/P EST MOD 30 MIN: CPT | Mod: 25

## 2025-01-07 PROCEDURE — 67028 INJECTION EYE DRUG: CPT

## 2025-01-07 PROCEDURE — 92134 CPTRZ OPH DX IMG PST SGM RTA: CPT

## 2025-02-06 ENCOUNTER — FOLLOW UP (OUTPATIENT)
Age: 70
End: 2025-02-06

## 2025-02-06 DIAGNOSIS — H43.813: ICD-10-CM

## 2025-02-06 DIAGNOSIS — H25.13: ICD-10-CM

## 2025-02-06 DIAGNOSIS — H34.8320: ICD-10-CM

## 2025-02-06 DIAGNOSIS — E11.9: ICD-10-CM

## 2025-02-06 DIAGNOSIS — Z79.4: ICD-10-CM

## 2025-02-06 DIAGNOSIS — H35.361: ICD-10-CM

## 2025-02-06 PROCEDURE — 99213 OFFICE O/P EST LOW 20 MIN: CPT

## 2025-02-06 PROCEDURE — 92134 CPTRZ OPH DX IMG PST SGM RTA: CPT

## 2025-03-20 ENCOUNTER — FOLLOW UP WITH CLINIC PROCEDURE (OUTPATIENT)
Age: 70
End: 2025-03-20

## 2025-03-20 DIAGNOSIS — H43.813: ICD-10-CM

## 2025-03-20 DIAGNOSIS — H35.361: ICD-10-CM

## 2025-03-20 DIAGNOSIS — Z79.4: ICD-10-CM

## 2025-03-20 DIAGNOSIS — H34.8320: ICD-10-CM

## 2025-03-20 DIAGNOSIS — E11.9: ICD-10-CM

## 2025-03-20 DIAGNOSIS — H25.13: ICD-10-CM

## 2025-03-20 PROCEDURE — 92134 CPTRZ OPH DX IMG PST SGM RTA: CPT

## 2025-03-20 PROCEDURE — 99213 OFFICE O/P EST LOW 20 MIN: CPT

## 2025-05-27 ENCOUNTER — FOLLOW UP WITH CLINIC PROCEDURE (OUTPATIENT)
Age: 70
End: 2025-05-27

## 2025-05-27 DIAGNOSIS — H43.813: ICD-10-CM

## 2025-05-27 DIAGNOSIS — H25.13: ICD-10-CM

## 2025-05-27 DIAGNOSIS — H34.8320: ICD-10-CM

## 2025-05-27 DIAGNOSIS — Z79.4: ICD-10-CM

## 2025-05-27 DIAGNOSIS — H35.361: ICD-10-CM

## 2025-05-27 DIAGNOSIS — E11.9: ICD-10-CM

## 2025-05-27 PROCEDURE — 92134 CPTRZ OPH DX IMG PST SGM RTA: CPT

## 2025-05-27 PROCEDURE — 67028 INJECTION EYE DRUG: CPT

## 2025-05-27 PROCEDURE — 99214 OFFICE O/P EST MOD 30 MIN: CPT | Mod: 25

## 2025-07-08 ENCOUNTER — FOLLOW UP WITH CLINIC PROCEDURE (OUTPATIENT)
Age: 70
End: 2025-07-08

## 2025-07-08 DIAGNOSIS — E11.9: ICD-10-CM

## 2025-07-08 DIAGNOSIS — H34.8320: ICD-10-CM

## 2025-07-08 DIAGNOSIS — H35.361: ICD-10-CM

## 2025-07-08 DIAGNOSIS — H43.812: ICD-10-CM

## 2025-07-08 DIAGNOSIS — H43.821: ICD-10-CM

## 2025-07-08 DIAGNOSIS — H25.13: ICD-10-CM

## 2025-07-08 DIAGNOSIS — Z79.4: ICD-10-CM

## 2025-07-08 PROCEDURE — 92134 CPTRZ OPH DX IMG PST SGM RTA: CPT

## 2025-07-08 PROCEDURE — 99213 OFFICE O/P EST LOW 20 MIN: CPT

## 2025-08-19 ENCOUNTER — FOLLOW UP (OUTPATIENT)
Age: 70
End: 2025-08-19

## 2025-08-19 DIAGNOSIS — H35.361: ICD-10-CM

## 2025-08-19 DIAGNOSIS — H34.8320: ICD-10-CM

## 2025-08-19 PROCEDURE — 92134 CPTRZ OPH DX IMG PST SGM RTA: CPT

## 2025-08-19 PROCEDURE — 67028 INJECTION EYE DRUG: CPT

## 2025-08-19 PROCEDURE — 99213 OFFICE O/P EST LOW 20 MIN: CPT | Mod: 25,LT
